# Patient Record
Sex: MALE | Race: BLACK OR AFRICAN AMERICAN | URBAN - METROPOLITAN AREA
[De-identification: names, ages, dates, MRNs, and addresses within clinical notes are randomized per-mention and may not be internally consistent; named-entity substitution may affect disease eponyms.]

---

## 2017-02-20 ENCOUNTER — THERAPY VISIT (OUTPATIENT)
Dept: PHYSICAL THERAPY | Facility: CLINIC | Age: 62
End: 2017-02-20
Payer: MEDICARE

## 2017-02-20 DIAGNOSIS — M54.2 CERVICALGIA: Primary | ICD-10-CM

## 2017-02-20 PROCEDURE — 97140 MANUAL THERAPY 1/> REGIONS: CPT | Mod: GP | Performed by: PHYSICAL THERAPIST

## 2017-02-20 PROCEDURE — 97161 PT EVAL LOW COMPLEX 20 MIN: CPT | Mod: GP | Performed by: PHYSICAL THERAPIST

## 2017-02-20 PROCEDURE — 97110 THERAPEUTIC EXERCISES: CPT | Mod: GP | Performed by: PHYSICAL THERAPIST

## 2017-02-20 PROCEDURE — G8981 BODY POS CURRENT STATUS: HCPCS | Mod: GP | Performed by: PHYSICAL THERAPIST

## 2017-02-20 PROCEDURE — G8982 BODY POS GOAL STATUS: HCPCS | Mod: GP | Performed by: PHYSICAL THERAPIST

## 2017-02-20 NOTE — LETTER
DEPARTMENT OF HEALTH AND HUMAN SERVICES  CENTERS FOR MEDICARE & MEDICAID SERVICES    PLAN/UPDATED PLAN OF PROGRESS FOR OUTPATIENT REHABILITATION    PATIENTS NAME:  Adolfo Koo   : 1955  PR[facility] NUMBER:    [id number]  [id number]   PROVIDER NAME: [facility] - [facility] [place] PHYSICAL THERAPY  MEDICAL RECORD NUMBER: [id number]   START OF CARE DATE:  SOC Date: 17   TYPE:  PT  PRIMARY/TREATMENT DIAGNOSIS: (Pertinent Medical Diagnosis)  Cervicalgia    VISITS FROM START OF CARE:  Rxs Used: 1     Subjective:    Adolfo Koo is a 61 year old male with a cervical spine condition.  Condition occurred with:  Insidious onset.  Condition occurred: for unknown reasons.  This is a chronic and recurrent condition  Patient presents to PT today with c/o ongoing chronic neck pain. Patient stated the pain has been ongoing for many years; has had multiple injections and other treatments ( PT, Chiropractic, Pain clinic, and Pool Therapy).  Patient receives assistance from his PCA with treatment of his neck (application of heating pad and creams)  Patient referred to PT on 17.  Patient reports pain:  Central cervical spine, cervical left side and cervical right side (L side > R side).  Radiates to:  None (has in the past).  Pain is described as aching and other (Tense pain) and is constant and reported as 4/10 and 9/10 (pain range).  Associated symptoms:  Headache and loss of motion/stiffness. Pain is worse during the day.  Symptoms are exacerbated by sitting, rotating head and other (standing/walking) and relieved by heat and analgesics.  Since onset symptoms are unchanged.  Special tests:  X-ray (Degenerative Changes).  Previous treatment includes physical therapy and chiropractic.  There was mild and moderate improvement following previous treatment.  General health as reported by patient is poor.  Pertinent medical history includes:  Smoking.  Medical allergies: no.  Other surgeries  include:  No.  Current medications:  Pain medication.  Current occupation is Patient does not work.      Barriers include:  Lives alone (has PCA to help wtih general ADL's each day (3 hrs/day)).  Red flags:  None as reported by the patient.                 Objective:  Standing Alignment:    Cervical/Thoracic:  Forward head  Shoulder/UE:  Rounded shoulders  Lumbar:  Normal  Cervical/Thoracic Evaluation  AROM:  AROM Cervical:  Flexion:            Chin to chest  Extension:       Mod loss; pain  Rotation:         Left: mod loss; pain L side     Right: min loss; no pain  Side Bend:      Left: min loss; pain L side     Right:  Min loss; pulling L side  Headaches: cervical  Cervical Myotomes:  Cervical myotomes:  strength: R= 55#; L= 45#  Cervical Dermatomes:  normal  Cervical Palpation:    Tenderness present at Left:    Upper Trap; Levator; Erector Spinae and Suboccipitals  Tenderness present at Right:    Upper Trap; Levator; Erector Spinae and Suboccipitals                    Assessment/Plan:      Patient is a 61 year old male with cervical complaints.    Patient has the following significant findings with corresponding treatment plan.                Diagnosis 1:  Neck Pain  Pain -  hot/cold therapy, US and manual therapy  Decreased ROM/flexibility - manual therapy, therapeutic exercise, therapeutic activity and home program  Decreased joint mobility - manual therapy and therapeutic exercise  Impaired muscle performance - neuro re-education  Decreased function - therapeutic activities  Impaired posture - neuro re-education  Therapy Evaluation Codes:   1) History comprised of:   Personal factors that impact the plan of care:      Past/current experiences and Time since onset of symptoms.    Comorbidity factors that impact the plan of care are:      Smoking.     Medications impacting care: Pain.  2) Examination of Body Systems comprised of:   Body structures and functions that impact the plan of care:      Cervical  "spine.   Activity limitations that impact the plan of care are:      Driving, Standing and Sleeping.  3) Clinical presentation characteristics are:   Stable/Uncomplicated.  4) Decision-Making    Low complexity using standardized patient assessment instrument and/or measureable assessment of functional outcome.  Cumulative Therapy Evaluation is: Low complexity.    Previous and current functional limitations:  (See Goal Flow Sheet for this information)    Short term and Long term goals: (See Goal Flow Sheet for this information)   Communication ability:  Patient appears to be able to clearly communicate and understand verbal and written communication and follow directions correctly.  Treatment Explanation - The following has been discussed with the patient:   RX ordered/plan of care  Anticipated outcomes  Possible risks and side effects  This patient would benefit from PT intervention to resume normal activities.   Rehab potential is good.  Frequency:  1 X week, once daily  Duration:  for 8 weeks  Discharge Plan:  Achieve all LTG.  Independent in home treatment program.  Reach maximal therapeutic benefit.        Caregiver Signature/Credentials _____________________________ Date ________       Treating Provider: Arlene Luther, PT   I have reviewed and certified the need for these services and plan of treatment while under my care.        PHYSICIAN'S SIGNATURE:   _________________________________________  Date___________     Jacques ROMA Rodney    Certification period:  Beginning of Cert date period: 02/20/17 to  End of Cert period date: 05/20/17     Functional Level Progress Report: Please see attached \"Goal Flow sheet for Functional level.\"    ____X____ Continue Services or       ________ DC Services                Service dates: From  SOC Date: 02/20/17 date to present                         "

## 2017-02-20 NOTE — PROGRESS NOTES
Subjective:    Adolfo Koo is a 61 year old male with a cervical spine condition.  Condition occurred with:  Insidious onset.  Condition occurred: for unknown reasons.  This is a chronic and recurrent condition  Patient presents to PT today with c/o ongoing chronic neck pain. Patient stated the pain has been ongoing for many years; has had multiple injections and other treatments ( PT, Chiropractic, Pain clinic, and Pool Therapy).  Patient receives assistance from his PCA with treatment of his neck (application of heating pad and creams)  Patient referred to PT on 2/13/17.  .    Patient reports pain:  Central cervical spine, cervical left side and cervical right side (L side > R side).  Radiates to:  None (has in the past).  Pain is described as aching and other (Tense pain) and is constant and reported as 4/10 and 9/10 (pain range).  Associated symptoms:  Headache and loss of motion/stiffness. Pain is worse during the day.  Symptoms are exacerbated by sitting, rotating head and other (standing/walking) and relieved by heat and analgesics.  Since onset symptoms are unchanged.  Special tests:  X-ray (Degenerative Changes).  Previous treatment includes physical therapy and chiropractic.  There was mild and moderate improvement following previous treatment.  General health as reported by patient is poor.  Pertinent medical history includes:  Smoking.  Medical allergies: no.  Other surgeries include:  No.  Current medications:  Pain medication.  Current occupation is Patient does not work.        Barriers include:  Lives alone (has PCA to help wtih general ADL's each day (3 hrs/day)).    Red flags:  None as reported by the patient.                      Objective:    Standing Alignment:    Cervical/Thoracic:  Forward head  Shoulder/UE:  Rounded shoulders  Lumbar:  Normal                                Cervical/Thoracic Evaluation    AROM:  AROM Cervical:    Flexion:            Chin to chest  Extension:       Mod  loss; pain  Rotation:         Left: mod loss; pain L side     Right: min loss; no pain  Side Bend:      Left: min loss; pain L side     Right:  Min loss; pulling L side      Headaches: cervical  Cervical Myotomes:  Cervical myotomes:  strength: R= 55#; L= 45#                      Cervical Dermatomes:  normal                    Cervical Palpation:    Tenderness present at Left:    Upper Trap; Levator; Erector Spinae and Suboccipitals  Tenderness present at Right:    Upper Trap; Levator; Erector Spinae and Suboccipitals                                                  General     ROS    Assessment/Plan:      Patient is a 61 year old male with cervical complaints.    Patient has the following significant findings with corresponding treatment plan.                Diagnosis 1:  Neck Pain  Pain -  hot/cold therapy, US and manual therapy  Decreased ROM/flexibility - manual therapy, therapeutic exercise, therapeutic activity and home program  Decreased joint mobility - manual therapy and therapeutic exercise  Impaired muscle performance - neuro re-education  Decreased function - therapeutic activities  Impaired posture - neuro re-education    Therapy Evaluation Codes:   1) History comprised of:   Personal factors that impact the plan of care:      Past/current experiences and Time since onset of symptoms.    Comorbidity factors that impact the plan of care are:      Smoking.     Medications impacting care: Pain.  2) Examination of Body Systems comprised of:   Body structures and functions that impact the plan of care:      Cervical spine.   Activity limitations that impact the plan of care are:      Driving, Standing and Sleeping.  3) Clinical presentation characteristics are:   Stable/Uncomplicated.  4) Decision-Making    Low complexity using standardized patient assessment instrument and/or measureable assessment of functional outcome.  Cumulative Therapy Evaluation is: Low complexity.    Previous and current  functional limitations:  (See Goal Flow Sheet for this information)    Short term and Long term goals: (See Goal Flow Sheet for this information)     Communication ability:  Patient appears to be able to clearly communicate and understand verbal and written communication and follow directions correctly.  Treatment Explanation - The following has been discussed with the patient:   RX ordered/plan of care  Anticipated outcomes  Possible risks and side effects  This patient would benefit from PT intervention to resume normal activities.   Rehab potential is good.    Frequency:  1 X week, once daily  Duration:  for 8 weeks  Discharge Plan:  Achieve all LTG.  Independent in home treatment program.  Reach maximal therapeutic benefit.    Please refer to the daily flowsheet for treatment today, total treatment time and time spent performing 1:1 timed codes.

## 2017-02-20 NOTE — MR AVS SNAPSHOT
"              After Visit Summary   2/20/2017    Adolfo Koo    MRN: [id number]           Patient Information     Date Of Birth          1955        Visit Information        Provider Department      2/20/2017 9:30 AM Arlene Luther, [place] [facility]        Today's Diagnoses     Cervicalgia    -  1       Follow-ups after your visit        Your next 10 appointments already scheduled     Mar 02, 2017  9:30 AM CST   VEGA Spine with Arlene Luther PT   [facility]  )    [address]  [place] [address]   [phone]            Mar 06, 2017  9:30 AM CST   VEGA Spine with Arlene Luther PT   [facility] ([facility]  )    [address]  [place] [address]   [phone]              Who to contact     If you have questions or need follow up information about today's clinic visit or your schedule please contact [facility] directly at [phone].  Normal or non-critical lab and imaging results will be communicated to you by MyChart, letter or phone within 4 business days after the clinic has received the results. If you do not hear from us within 7 days, please contact the clinic through MyChart or phone. If you have a critical or abnormal lab result, we will notify you by phone as soon as possible.  Submit refill requests through [employer] or call your pharmacy and they will forward the refill request to us. Please allow 3 business days for your refill to be completed.          Additional Information About Your Visit        [employer] Information     [employer] lets you send messages to your doctor, view your test results, renew your prescriptions, schedule appointments and more. To sign up, go to [email or web address] . Click on \"Log " "in\" on the left side of the screen, which will take you to the Welcome page. Then click on \"Sign up Now\" on the right side of the page.     You will be asked to enter the access code listed below, as well as some personal information. Please follow the directions to create your username and password.     Your access code is: [email or web address]  Expires: 2017 10:30 AM     Your access code will  in 90 days. If you need help or a new code, please call your [facility] clinic or [phone].        Care EveryWhere ID     This is your Care EveryWhere ID. This could be used by other organizations to access your [facility] medical records  [id number]         Blood Pressure from Last 3 Encounters:   01/21/15 122/71   08 94/64   08 98/60    Weight from Last 3 Encounters:   01/21/15 73.9 kg (163 lb)   07 79.4 kg (175 lb)   06 69.4 kg (153 lb)              We Performed the Following     HC PT EVAL, LOW COMPLEXITY     VEGA CERT REPORT     MANUAL THER TECH,1+REGIONS,EA 15 MIN     THERAPEUTIC EXERCISES        Primary Care Provider Office Phone # Fax #    Js Farris [phone]       [facility] [address] [facility] [address]        Thank you!     Thank you for [facility] PH[facility] THERAP[facility]  for your care. Our goal is always to provide you with excellent care. Hearing back from our patients is one way we can continue to improve our services. Please take a few minutes to complete the written survey that you may receive in the mail after your visit with us. Thank you!             Your Updated Medication List - Protect others around you: Learn how to safely use, store and throw away your medicines at www.disposemymeds.org.          This list is accurate as of: 17 10:30 AM.  Always use your most recent med list.                   Brand Name Dispense Instructions for use    ADVAIR DISKUS 250-50 MCG/DOSE diskus inhaler "   Generic drug:  fluticasone-salmeterol     1    1 inhalation BID       albuterol 108 (90 BASE) MCG/ACT Inhaler   Generic drug:  albuterol     1    2 puffs up to four times per day as needed       albuterol 90 MCG/ACT inhaler     1    1-2 puffs Q 4-6 hrs prn       ibuprofen 600 MG tablet    ADVIL/MOTRIN    90    1 tablet up to three times per day as needed (take with food)       LIDODERM 5 % Patch   Generic drug:  lidocaine     30    Apply 1-3 daily as neede to painful areas       NEURONTIN 100 MG capsule   Generic drug:  gabapentin     90    Take 1 at night.  Increase by 1 every 7 days until taking 1 every 8 hrs.       SINGULAIR 10 MG tablet   Generic drug:  montelukast     1 month    1 tab po QD (Once per day)       ULTRAM 50 MG tablet   Generic drug:  traMADol     90    1 TABLET EVERY 8-12 HOURS AS NEEDED

## 2017-03-02 ENCOUNTER — THERAPY VISIT (OUTPATIENT)
Dept: PHYSICAL THERAPY | Facility: CLINIC | Age: 62
End: 2017-03-02
Payer: MEDICARE

## 2017-03-02 DIAGNOSIS — M54.2 CERVICALGIA: ICD-10-CM

## 2017-03-02 PROCEDURE — 97110 THERAPEUTIC EXERCISES: CPT | Mod: GP | Performed by: PHYSICAL THERAPIST

## 2017-03-02 PROCEDURE — 97112 NEUROMUSCULAR REEDUCATION: CPT | Mod: GP | Performed by: PHYSICAL THERAPIST

## 2017-03-02 PROCEDURE — 97140 MANUAL THERAPY 1/> REGIONS: CPT | Mod: GP | Performed by: PHYSICAL THERAPIST

## 2017-03-14 ENCOUNTER — THERAPY VISIT (OUTPATIENT)
Dept: PHYSICAL THERAPY | Facility: CLINIC | Age: 62
End: 2017-03-14
Payer: MEDICARE

## 2017-03-14 DIAGNOSIS — M54.2 CERVICALGIA: ICD-10-CM

## 2017-03-14 PROCEDURE — 97110 THERAPEUTIC EXERCISES: CPT | Mod: GP | Performed by: PHYSICAL THERAPIST

## 2017-03-14 PROCEDURE — G0283 ELEC STIM OTHER THAN WOUND: HCPCS | Mod: GP | Performed by: PHYSICAL THERAPIST

## 2017-03-14 NOTE — LETTER
[facility] PHYSICAL THERAPY  [address]  [place] [address]  [phone]    2017    Re: Adolfo Koo   :   1955  MRN:  [id number]   REFERRING PHYSICIAN:   Jacques Rodney    [facility] PHYSICAL [facility]    Date of Initial Evaluation:  2017  Visits:  Rxs Used: 3  Reason for Referral:  Cervicalgia    EVALUATION SUMMARY    PROGRESS  REPORT  Progress reporting period is from 17 to 3/14/17.       SUBJECTIVE  Subjective changes noted by patient:  Pt not feeling well today; has lots of pain in his back and legs today. pt has not taken any pain medication because he had to drive and doesn't like to take the medication when he has to drive. Patient would like to follow up with MD before doing any more PT.    Current pain level is: 10/10.     Previous pain level was: 5/10.   Changes in function:  None  Adverse reaction to treatment or activity: treatment and activity - increase pain  OBJECTIVE  Changes noted in objective findings:    Objective information from 3/14/17 date of service.  Poor tolerance for activity today; constant c/o severe pain.    ASSESSMENT/PLAN  Updated problem list and treatment plan: Diagnosis 1:  Neck pain  Pain -  hot/cold therapy, US, electric stimulation and manual therapy  Impaired muscle performance - home program  Decreased function - therapeutic activities and home program  STG/LTGs have been met or progress has been made towards goals:  None  Assessment of Progress: The patient has had set backs in their progress.  The patient's condition has exacerbated.  Self Management Plans:  Patient has been instructed in a home treatment program.        Recommendations:  Pt would like to return to MD for follow up due to elevated pain.          Thank you for your referral.    INQUIRIES  Therapist: Arlene Luther, [facility] ATH[facility] PHYSICAL  THERAPY  [address]  [place] [address]  Phone: [phone]  Fax: [phone]

## 2017-03-14 NOTE — MR AVS SNAPSHOT
"              After Visit Summary   3/14/2017    Adolfo Koo    MRN: [id number]           Patient Information     Date Of Birth          1955        Visit Information        Provider Department      3/14/2017 9:20 AM [facility]        Today's Diagnoses     Cervicalgia           Follow-ups after your visit        Who to contact     If you have questions or need follow up information about today's clinic visit or your schedule please contact [facility] directly at [phone].  Normal or non-critical lab and imaging results will be communicated to you by [employer]hart, letter or phone within 4 business days after the clinic has received the results. If you do not hear from us within 7 days, please contact the clinic through [employer] or phone. If you have a critical or abnormal lab result, we will notify you by phone as soon as possible.  Submit refill requests through [employer] or call your pharmacy and they will forward the refill request to us. Please allow 3 business days for your refill to be completed.          Additional Information About Your Visit        MyChart Information     [employer] lets you send messages to your doctor, view your test results, renew your prescriptions, schedule appointments and more. To sign up, go to [email or web address] . Click on \"Log in\" on the left side of the screen, which will take you to the Welcome page. Then click on \"Sign up Now\" on the right side of the page.     You will be asked to enter the access code listed below, as well as some personal information. Please follow the directions to create your username and password.     Your access code is: [email or web address]  Expires: 2017 11:30 AM     Your access code will  in 90 days. If you need help or a new code, please call your [facility] clinic or [phone].        Care EveryWhere ID     This " is your Care EveryWhere ID. This could be used by other organizations to access your [facility] medical records  [id number]         Blood Pressure from Last 3 Encounters:   01/21/15 122/71   08/07/08 94/64   06/26/08 98/60    Weight from Last 3 Encounters:   01/21/15 73.9 kg (163 lb)   05/30/07 79.4 kg (175 lb)   11/02/06 69.4 kg (153 lb)              We Performed the Following     ELECTRIC STIMULATION THERAPY     THERAPEUTIC EXERCISES        Primary Care Provider Office Phone # Fax #    Js Farris [phone]       [facility] [place] [address] [facility] [address]        Thank you!     Thank you for choosing [facility] PHYSICAL THERAPY  for your care. Our goal is always to provide you with excellent care. Hearing back from our patients is one way we can continue to improve our services. Please take a few minutes to complete the written survey that you may receive in the mail after your visit with us. Thank you!             Your Updated Medication List - Protect others around you: Learn how to safely use, store and throw away your medicines at www.disposemymeds.org.          This list is accurate as of: 3/14/17  1:37 PM.  Always use your most recent med list.                   Brand Name Dispense Instructions for use    ADVAIR DISKUS 250-50 MCG/DOSE diskus inhaler   Generic drug:  fluticasone-salmeterol     1    1 inhalation BID       albuterol 108 (90 BASE) MCG/ACT Inhaler   Generic drug:  albuterol     1    2 puffs up to four times per day as needed       albuterol 90 MCG/ACT inhaler     1    1-2 puffs Q 4-6 hrs prn       ibuprofen 600 MG tablet    ADVIL/MOTRIN    90    1 tablet up to three times per day as needed (take with food)       LIDODERM 5 % Patch   Generic drug:  lidocaine     30    Apply 1-3 daily as neede to painful areas       NEURONTIN 100 MG capsule   Generic drug:  gabapentin     90    Take 1 at night.  Increase by 1 every 7  days until taking 1 every 8 hrs.       SINGULAIR 10 MG tablet   Generic drug:  montelukast     1 month    1 tab po QD (Once per day)       ULTRAM 50 MG tablet   Generic drug:  traMADol     90    1 TABLET EVERY 8-12 HOURS AS NEEDED

## 2017-03-28 PROBLEM — M54.2 CERVICALGIA: Status: RESOLVED | Noted: 2017-02-20 | Resolved: 2017-03-28

## 2017-03-28 NOTE — PROGRESS NOTES
Subjective:    HPI                    Objective:    System    Physical Exam    General     ROS    Assessment/Plan:      PROGRESS  REPORT    Progress reporting period is from 2/20/17 to 3/14/17.       SUBJECTIVE  Subjective changes noted by patient:  Pt not feeling well today; has lots of pain in his back and legs today. pt has not taken any pain medication because he had to drive and doesn't like to take the medication when he has to drive. Patient would like to follow up with MD before doing any more PT.    Current pain level is: 10/10.     Previous pain level was: 5/10.   Changes in function:  None  Adverse reaction to treatment or activity: treatment and activity - increase pain    OBJECTIVE  Changes noted in objective findings:    Objective information from 3/14/17 date of service.  Poor tolerance for activity today; constant c/o severe pain.      ASSESSMENT/PLAN  Updated problem list and treatment plan: Diagnosis 1:  Neck pain  Pain -  hot/cold therapy, US, electric stimulation and manual therapy  Impaired muscle performance - home program  Decreased function - therapeutic activities and home program  STG/LTGs have been met or progress has been made towards goals:  None  Assessment of Progress: The patient has had set backs in their progress.  The patient's condition has exacerbated.  Self Management Plans:  Patient has been instructed in a home treatment program.        Recommendations:  Pt would like to return to MD for follow up due to elevated pain.    Please refer to the daily flowsheet for treatment today, total treatment time and time spent performing 1:1 timed codes.

## 2017-05-10 ENCOUNTER — THERAPY VISIT (OUTPATIENT)
Dept: PHYSICAL THERAPY | Facility: CLINIC | Age: 62
End: 2017-05-10
Payer: MEDICARE

## 2017-05-10 DIAGNOSIS — M54.2 CERVICALGIA: Primary | ICD-10-CM

## 2017-05-10 PROCEDURE — G8981 BODY POS CURRENT STATUS: HCPCS | Mod: GP | Performed by: PHYSICAL THERAPIST

## 2017-05-10 PROCEDURE — G8982 BODY POS GOAL STATUS: HCPCS | Mod: GP | Performed by: PHYSICAL THERAPIST

## 2017-05-10 PROCEDURE — 97112 NEUROMUSCULAR REEDUCATION: CPT | Mod: GP | Performed by: PHYSICAL THERAPIST

## 2017-05-10 PROCEDURE — 97161 PT EVAL LOW COMPLEX 20 MIN: CPT | Mod: GP | Performed by: PHYSICAL THERAPIST

## 2017-05-10 PROCEDURE — 97110 THERAPEUTIC EXERCISES: CPT | Mod: GP | Performed by: PHYSICAL THERAPIST

## 2017-05-10 NOTE — LETTER
DEPARTMENT OF HEALTH AND HUMAN SERVICES  CENTERS FOR MEDICARE & MEDICAID SERVICES    PLAN/UPDATED PLAN OF PROGRESS FOR OUTPATIENT REHABILITATION    PATIENTS NAME:  Adolfo Koo   : 1955  PROVIDER NUMBER:    [id number]    [facility]  [id number]     PROVIDER NAME: [place] [facility]AP[facility]    MEDICAL RECORD NUMBER: [id number]     START OF CARE DATE:  SOC Date: 05/10/17   TYPE:  PT    PRIMARY/TREATMENT DIAGNOSIS: (Pertinent Medical Diagnosis)  Cervicalgia    VISITS FROM START OF CARE:  Rxs Used: 1     [place] [facility] Initial Evaluation    Subjective:  HPI Comments: At the time this evaluation, MD orders not present, pt requesting treatment for his neck as it is the worst. Patient reporting that he has had much PT in the past, including at pain clinic.  Patient is a 61 year old male presenting with rehab cervical spine hpi.   Adolfo Koo is a 61 year old male with a cervical spine condition.      This is a chronic condition  Patient MD one week ago for neck and LBP that has been ongoing since  when he originally fell down a flight of steps at work. WC case now settled.    Patient reports pain:  Cervical left side.  Radiates to:  Head.  Pain is described as aching and is intermittent and reported as 5/10 and 8/10 (at its worst).  Associated symptoms:  Loss of motion/stiffness and headache. Pain is worse in the P.M..  Symptoms are exacerbated by rotating head (laying on left side, sitting) and relieved by ice and heat (pain meds).  Since onset symptoms are gradually worsening.  Special tests:  X-ray (pt unclear as to the results).  Previous treatment includes physical therapy.  There was mild improvement following previous treatment.  General health as reported by patient is good.  Pertinent medical history includes:  Asthma.  Medical allergies: no.  Other surgeries include:  Orthopedic surgery (bilateral knees).  Current medications:  Pain medication  (Ibuprofen).  Current occupation is none.      Barriers: PCA 7 days/week, 4 hours/day.  Red flags:  None as reported by the patient.                  Objective:  Flexibility/Screens:   Upper Extremity:    Decreased left upper extremity flexibility at:  Pectoralis Major and Pectoralis Minor  Decreased right upper extremity flexibility present at:  Pectoralis Major and Pectoralis Minor  PATIENTS NAME:  Adolfo Koo   : 1955    Spine:  Decreased left spine flexibility:  Sternocleidomastoid; Scalenes and Upper Trap  Decreased right spine flexibility:  Sternocleidomastoid; Scalenes and Upper Trap         Cervical/Thoracic Evaluation  Cervical Myotomes:  normal  Cervical Dermatomes:  normal  Cervical Palpation:    Tenderness present at Left:    Sternocleidomstoid; Scalenes; Upper Trap and Erector Spinae  Spinal Segmental Conclusions:    Level:  Hypo at T2, T1, C7, C6, C5 and C4    Josias Cervical Evaluation  Posture:  Sitting: poor  Standing: poor  Protruding Head: yes  Wry Neck: no  Correction of Posture: no effect  Movement Loss:  Protrusion (PRO): nil  Flexion (Flex): nil  Retraction (RET): min and pain  Extension (EXT): major and pain  Lateral Flexion Right (LF R): major and pain  Lateral Flexion Left (LF L): major and pain  Rotation Right (ROT R): min and pain  Rotation Left (ROT L): mod and pain  Test Movements:  RET: During: increases  After: no worse  Mechanical Response: no effect  Repeat RET: During: decreases  After: better  Mechanical Response: IncROM  RET EXT: During: increases  After: no worse  Mechanical Response: no effect  Repeat RET EXT: During: increases  After: worse  Mechanical Response: no effect  Pretest Pain Sitting: L cervical  LF L: During: increases  After: no worse  Mechanical Response: no effect  Repeat LF L: During: decreases  After: better  Mechanical Response: IncROM  Conclusion: derangement (vs other, provisional)  Principle of Treatment:  Posture Correction: slouch/over  correct, location of neutral spine, use of lumbar support, use of cervical roll while sleeping  Extension: seated retraction with pt OP  Lateral: seated left lateral flexion with pt OP    Assessment/Plan:    Patient is a 61 year old male with cervical complaints.    Patient has the following significant findings with corresponding treatment plan.                  PATIENTS NAME:  Adolfo Koo   : 1955    Diagnosis 1:  Neck pain  Pain -  manual therapy, self management, education, directional preference exercise and home program  Decreased ROM/flexibility - manual therapy, therapeutic exercise and home program  Decreased joint mobility - manual therapy, therapeutic exercise and home program  Decreased function - therapeutic activities and home program  Impaired posture - neuro re-education, therapeutic activities and home program    Therapy Evaluation Codes:   1) History comprised of:   Personal factors that impact the plan of care:      Time since onset of symptoms.    Comorbidity factors that impact the plan of care are:      Asthma.     Medications impacting care: Pain.  2) Examination of Body Systems comprised of:   Body structures and functions that impact the plan of care:      Cervical spine.   Activity limitations that impact the plan of care are:      Lifting, Sitting and Sleeping.  3) Clinical presentation characteristics are:   Stable/Uncomplicated.  4) Decision-Making    Low complexity using standardized patient assessment instrument and/or measureable assessment of functional outcome.  Cumulative Therapy Evaluation is: Low complexity.    Previous and current functional limitations:  (See Goal Flow Sheet for this information)    Short term and Long term goals: (See Goal Flow Sheet for this information)     Communication ability:  Patient appears to be able to clearly communicate and understand verbal and written communication and follow directions correctly.  Treatment Explanation - The following  "has been discussed with the patient:   RX ordered/plan of care  Anticipated outcomes  Possible risks and side effects  This patient would benefit from PT intervention to resume normal activities.   Rehab potential is fair to good.    Frequency:  1 X week, once daily  Duration:  for 8 weeks  Discharge Plan:  Achieve all LTG.  Independent in home treatment program.  Reach maximal therapeutic benefit.    Caregiver Signature/Credentials _____________________________ Date ________       Treating Provider: Susie Sherwood, PT     PATIENTS NAME:  Adolfo Koo   : 1955    I have reviewed and certified the need for these services and plan of treatment while under my care.        PHYSICIAN'S SIGNATURE:   _________________________________________  Date___________   Julia Lee MD    Certification period:  Beginning of Cert date period: 05/10/17 to  End of Cert period date: 17     Functional Level Progress Report: Please see attached \"Goal Flow sheet for Functional level.\"    ____X____ Continue Services or       ________ DC Services                Service dates: From  SOC Date: 05/10/17 date to present                         "

## 2017-05-10 NOTE — PROGRESS NOTES
[facility] Initial Evaluation      Subjective:    HPI Comments: At the time this evaluation, MD orders not present, pt requesting treatment for his neck as it is the worst. Patient reporting that he has had much PT in the past, including at pain clinic.    Patient is a 61 year old male presenting with rehab cervical spine hpi.   Adolfo Koo is a 61 year old male with a cervical spine condition.      This is a chronic condition  Patient MD on 5/3/17 for neck and LBP that has been ongoing since 2002 when he originally fell down a flight of steps at work. WC case now settled. MD orders dated 5/10/17.    Patient reports pain:  Cervical left side.  Radiates to:  Head.  Pain is described as aching and is intermittent and reported as 5/10 and 8/10 (at its worst).  Associated symptoms:  Loss of motion/stiffness and headache. Pain is worse in the P.M..  Symptoms are exacerbated by rotating head (laying on left side, sitting) and relieved by ice and heat (pain meds).  Since onset symptoms are gradually worsening.  Special tests:  X-ray (pt unclear as to the results).  Previous treatment includes physical therapy.  There was mild improvement following previous treatment.  General health as reported by patient is good.  Pertinent medical history includes:  Asthma.  Medical allergies: no.  Other surgeries include:  Orthopedic surgery (bilateral knees).  Current medications:  Pain medication (Ibuprofen).  Current occupation is none.        Barriers: PCA 7 days/week, 4 hours/day.    Red flags:  None as reported by the patient.                        Objective:          Flexibility/Screens:     Upper Extremity:    Decreased left upper extremity flexibility at:  Pectoralis Major and Pectoralis Minor    Decreased right upper extremity flexibility present at:  Pectoralis Major and Pectoralis Minor    Spine:  Decreased left spine flexibility:  Sternocleidomastoid; Scalenes and Upper Trap    Decreased right  spine flexibility:  Sternocleidomastoid; Scalenes and Upper Trap                  Cervical/Thoracic Evaluation      Cervical Myotomes:  normal                      Cervical Dermatomes:  normal                    Cervical Palpation:    Tenderness present at Left:    Sternocleidomstoid; Scalenes; Upper Trap and Erector Spinae        Spinal Segmental Conclusions:    Level:  Hypo at T2, T1, C7, C6, C5 and C4                                                Josias Cervical Evaluation    Posture:  Sitting: poor  Standing: poor  Protruding Head: yes  Wry Neck: no  Correction of Posture: no effect    Movement Loss:  Protrusion (PRO): nil  Flexion (Flex): nil  Retraction (RET): min and pain  Extension (EXT): major and pain  Lateral Flexion Right (LF R): major and pain  Lateral Flexion Left (LF L): major and pain  Rotation Right (ROT R): min and pain  Rotation Left (ROT L): mod and pain  Test Movements:      RET: During: increases  After: no worse  Mechanical Response: no effect  Repeat RET: During: decreases  After: better  Mechanical Response: IncROM  RET EXT: During: increases  After: no worse  Mechanical Response: no effect  Repeat RET EXT: During: increases  After: worse  Mechanical Response: no effect        Pretest Pain Sitting: L cervical    LF L: During: increases  After: no worse  Mechanical Response: no effect  Repeat LF L: During: decreases  After: better  Mechanical Response: IncROM            Conclusion: derangement (vs other, provisional)  Principle of Treatment:  Posture Correction: slouch/over correct, location of neutral spine, use of lumbar support, use of cervical roll while sleeping    Extension: seated retraction with pt OP  Lateral: seated left lateral flexion with pt OP                                           ROS    Assessment/Plan:      Patient is a 61 year old male with cervical complaints.    Patient has the following significant findings with corresponding treatment plan.                Diagnosis  1:  Neck pain  Pain -  manual therapy, self management, education, directional preference exercise and home program  Decreased ROM/flexibility - manual therapy, therapeutic exercise and home program  Decreased joint mobility - manual therapy, therapeutic exercise and home program  Decreased function - therapeutic activities and home program  Impaired posture - neuro re-education, therapeutic activities and home program    Therapy Evaluation Codes:   1) History comprised of:   Personal factors that impact the plan of care:      Time since onset of symptoms.    Comorbidity factors that impact the plan of care are:      Asthma.     Medications impacting care: Pain.  2) Examination of Body Systems comprised of:   Body structures and functions that impact the plan of care:      Cervical spine.   Activity limitations that impact the plan of care are:      Lifting, Sitting and Sleeping.  3) Clinical presentation characteristics are:   Stable/Uncomplicated.  4) Decision-Making    Low complexity using standardized patient assessment instrument and/or measureable assessment of functional outcome.  Cumulative Therapy Evaluation is: Low complexity.    Previous and current functional limitations:  (See Goal Flow Sheet for this information)    Short term and Long term goals: (See Goal Flow Sheet for this information)     Communication ability:  Patient appears to be able to clearly communicate and understand verbal and written communication and follow directions correctly.  Treatment Explanation - The following has been discussed with the patient:   RX ordered/plan of care  Anticipated outcomes  Possible risks and side effects  This patient would benefit from PT intervention to resume normal activities.   Rehab potential is fair to good.    Frequency:  1 X week, once daily  Duration:  for 8 weeks  Discharge Plan:  Achieve all LTG.  Independent in home treatment program.  Reach maximal therapeutic benefit.    Please refer to the  daily flowsheet for treatment today, total treatment time and time spent performing 1:1 timed codes.

## 2017-05-10 NOTE — LETTER
DEPARTMENT OF HEALTH AND HUMAN SERVICES  CENTERS FOR MEDICARE & MEDICAID SERVICES    PLAN/UPDATED PLAN OF PROGRESS FOR OUTPATIENT REHABILITATION    PATIENTS NAME:  Adolfo Koo   : 1955  PROVIDER NUMBER:    [id number]    [id number]     PROVIDER NAME: [facility] [facility]    MEDICAL RECORD NUMBER: [id number]     START OF CARE DATE:  SOC Date: 05/10/17   TYPE:  PT    PRIMARY/TREATMENT DIAGNOSIS: (Pertinent Medical Diagnosis)  Cervicalgia    VISITS FROM START OF CARE:  Rxs Used: 1     [place] [facility] Initial Evaluation    Subjective:  HPI Comments: At the time this evaluation, MD orders not present, pt requesting treatment for his neck as it is the worst. Patient reporting that he has had much PT in the past, including at pain clinic.  Patient is a 61 year old male presenting with rehab cervical spine hpi.   Adolfo Koo is a 61 year old male with a cervical spine condition.  This is a chronic condition  Patient MD on 5/3/17 for neck and LBP that has been ongoing since  when he originally fell down a flight of steps at work. WC case now settled. MD orders dated 5/10/17.    Patient reports pain:  Cervical left side.  Radiates to:  Head.  Pain is described as aching and is intermittent and reported as 5/10 and 8/10 (at its worst).  Associated symptoms:  Loss of motion/stiffness and headache. Pain is worse in the P.M..  Symptoms are exacerbated by rotating head (laying on left side, sitting) and relieved by ice and heat (pain meds).  Since onset symptoms are gradually worsening.  Special tests:  X-ray (pt unclear as to the results).  Previous treatment includes physical therapy.  There was mild improvement following previous treatment.  General health as reported by patient is good.  Pertinent medical history includes:  Asthma.  Medical allergies: no.  Other surgeries include:  Orthopedic surgery (bilateral knees).  Current medications:   Pain medication (Ibuprofen).  Current occupation is none.      Barriers: PCA 7 days/week, 4 hours/day.  Red flags:  None as reported by the patient.         Objective:  Flexibility/Screens:   Upper Extremity:    Decreased left upper extremity flexibility at:  Pectoralis Major and Pectoralis Minor    PATIENTS NAME:  Adolfo Koo   : 1955    Decreased right upper extremity flexibility present at:  Pectoralis Major and Pectoralis Minor  Spine:  Decreased left spine flexibility:  Sternocleidomastoid; Scalenes and Upper Trap  Decreased right spine flexibility:  Sternocleidomastoid; Scalenes and Upper Trap          Cervical/Thoracic Evaluation  Cervical Myotomes:  normal  Cervical Dermatomes:  normal  Cervical Palpation:    Tenderness present at Left:    Sternocleidomstoid; Scalenes; Upper Trap and Erector Spinae  Spinal Segmental Conclusions:    Level:  Hypo at T2, T1, C7, C6, C5 and C4    Josias Cervical Evaluation  Posture:  Sitting: poor  Standing: poor  Protruding Head: yes  Wry Neck: no  Correction of Posture: no effect  Movement Loss:  Protrusion (PRO): nil  Flexion (Flex): nil  Retraction (RET): min and pain  Extension (EXT): major and pain  Lateral Flexion Right (LF R): major and pain  Lateral Flexion Left (LF L): major and pain  Rotation Right (ROT R): min and pain  Rotation Left (ROT L): mod and pain  Test Movements:  RET: During: increases  After: no worse  Mechanical Response: no effect  Repeat RET: During: decreases  After: better  Mechanical Response: IncROM  RET EXT: During: increases  After: no worse  Mechanical Response: no effect  Repeat RET EXT: During: increases  After: worse  Mechanical Response: no effect  Pretest Pain Sitting: L cervical  LF L: During: increases  After: no worse  Mechanical Response: no effect  Repeat LF L: During: decreases  After: better  Mechanical Response: IncROM  Conclusion: derangement (vs other, provisional)  Principle of Treatment:  Posture Correction:  slouch/over correct, location of neutral spine, use of lumbar support, use of cervical roll while sleeping  Extension: seated retraction with pt OP  Lateral: seated left lateral flexion with pt OP      Assessment/Plan:    Patient is a 61 year old male with cervical complaints.    PATIENTS NAME:  Adolfo Koo   : 1955    Patient has the following significant findings with corresponding treatment plan.                Diagnosis 1:  Neck pain  Pain -  manual therapy, self management, education, directional preference exercise and home program  Decreased ROM/flexibility - manual therapy, therapeutic exercise and home program  Decreased joint mobility - manual therapy, therapeutic exercise and home program  Decreased function - therapeutic activities and home program  Impaired posture - neuro re-education, therapeutic activities and home program    Therapy Evaluation Codes:   1) History comprised of:   Personal factors that impact the plan of care:      Time since onset of symptoms.    Comorbidity factors that impact the plan of care are:      Asthma.     Medications impacting care: Pain.  2) Examination of Body Systems comprised of:   Body structures and functions that impact the plan of care:      Cervical spine.   Activity limitations that impact the plan of care are:      Lifting, Sitting and Sleeping.  3) Clinical presentation characteristics are:   Stable/Uncomplicated.  4) Decision-Making    Low complexity using standardized patient assessment instrument and/or measureable assessment of functional outcome.  Cumulative Therapy Evaluation is: Low complexity.    Previous and current functional limitations:  (See Goal Flow Sheet for this information)    Short term and Long term goals: (See Goal Flow Sheet for this information)     Communication ability:  Patient appears to be able to clearly communicate and understand verbal and written communication and follow directions correctly.  Treatment Explanation -  "The following has been discussed with the patient:   RX ordered/plan of care  Anticipated outcomes  Possible risks and side effects  This patient would benefit from PT intervention to resume normal activities.   Rehab potential is fair to good.    Frequency:  1 X week, once daily  Duration:  for 8 weeks  Discharge Plan:  Achieve all LTG.  Independent in home treatment program.  Reach maximal therapeutic benefit.        PATIENTS NAME:  Adolfo Koo   : 1955    Caregiver Signature/Credentials _____________________________ Date ________       Treating Provider: Susie Sherwood, PT     I have reviewed and certified the need for these services and plan of treatment while under my care.        PHYSICIAN'S SIGNATURE:   _________________________________________  Date___________   Obdulio Guerrero    Certification period:  Beginning of Cert date period: 05/10/17 to  End of Cert period date: 17     Functional Level Progress Report: Please see attached \"Goal Flow sheet for Functional level.\"    ____X____ Continue Services or       ________ DC Services                Service dates: From  SOC Date: 05/10/17 date to present                         "

## 2017-07-11 PROBLEM — M54.2 CERVICALGIA: Status: RESOLVED | Noted: 2017-05-10 | Resolved: 2017-07-11

## 2017-07-11 NOTE — PROGRESS NOTES
Please refer to Initial Evaluation for discharge status as well.  Patient did not return for further PT.

## 2018-05-08 ENCOUNTER — THERAPY VISIT (OUTPATIENT)
Dept: PHYSICAL THERAPY | Facility: CLINIC | Age: 63
End: 2018-05-08
Payer: MEDICARE

## 2018-05-08 DIAGNOSIS — M47.817 LUMBOSACRAL SPONDYLOSIS WITHOUT MYELOPATHY: Primary | ICD-10-CM

## 2018-05-08 DIAGNOSIS — M47.812 CERVICAL SPONDYLOSIS WITHOUT MYELOPATHY: ICD-10-CM

## 2018-05-08 PROCEDURE — 97112 NEUROMUSCULAR REEDUCATION: CPT | Mod: GP | Performed by: PHYSICAL THERAPIST

## 2018-05-08 PROCEDURE — G8981 BODY POS CURRENT STATUS: HCPCS | Mod: GP | Performed by: PHYSICAL THERAPIST

## 2018-05-08 PROCEDURE — G8982 BODY POS GOAL STATUS: HCPCS | Mod: GP | Performed by: PHYSICAL THERAPIST

## 2018-05-08 PROCEDURE — 97110 THERAPEUTIC EXERCISES: CPT | Mod: GP | Performed by: PHYSICAL THERAPIST

## 2018-05-08 PROCEDURE — G8983 BODY POS D/C STATUS: HCPCS | Mod: GP | Performed by: PHYSICAL THERAPIST

## 2018-05-08 PROCEDURE — 97162 PT EVAL MOD COMPLEX 30 MIN: CPT | Mod: GP | Performed by: PHYSICAL THERAPIST

## 2018-05-08 NOTE — LETTER
"DEPARTMENT OF HEALTH AND HUMAN SERVICES  CENTERS FOR MEDICARE & MEDICAID SERVICES    PLAN/UPDATED PLAN OF PROGRESS FOR OUTPATIENT REHABILITATION    PATIENTS NAME:  Adolfo Koo   : 1955  PROVIDER NUMBER:    [id number]    [id number]     PROVIDER NAME: [place] [facility]    MEDICAL RECORD NUMBER: [id number]     START OF CARE DATE:  SOC Date: 18   TYPE:  PT    PRIMARY/TREATMENT DIAGNOSIS: (Pertinent Medical Diagnosis)   Lumbosacral spondylosis without myelopathy  Cervical spondylosis without myelopathy    VISITS FROM START OF CARE:  Rxs Used: 1     [place] [facility] Initial Evaluation    Subjective:  Patient is a 62 year old male presenting with rehab cervical spine hpi and rehab back hpi.            Patient reports pain:  Cervical left side.  Radiates to:  Shoulder left, upper arm left, elbow left, lower arm left and hand left.  Pain is described as sharp and is intermittent and reported as 10/10.  Associated symptoms:  Tingling, numbness, headache, loss of strength and loss of motion/stiffness (T/N down L arm to hand daily, L arm gets tired, suboccipital HA's 2x/week). Pain is the same all the time.  Symptoms are exacerbated by rotating head and looking up or down (turning head to the left, losing 1-2 hours of sleep per night) and relieved by analgesics, ice and heat (TENS unit).  Since onset symptoms are gradually worsening.  General health as reported by patient is fair.    Medical allergies: no.    Current medications:  Pain medication.  Current occupation is None.      Adolfo Koo is a 62 year old male with a lumbar condition.      This is a chronic condition  18 saw MD For ongoing LBP since  when he originally fell down a flight of steps at .    Patient reports pain:  Lumbar spine right.  Radiates to:  No radiation.  Pain is described as aching ('boom boom pain\") and is intermittent and reported as 10/10.  Associated " symptoms:  Loss of motion/stiffness (B knees buckle due to cramps). Pain is the same all the time.  Symptoms are exacerbated by sitting, walking, standing, bending and lifting (sitting 10 min) and relieved by ice, heat and analgesics.  Since onset symptoms are gradually worsening.                          Objective:       Lumbar/SI Evaluation  ROM:    AROM Lumbar:   Flexion:          Min loss, reaches to mid shin with pain and difficulty with curve reversal   PATIENTS NAME:  Adolfo Koo   : 1955    Ext:                    Min loss no pain   Side Bend:        Left:     Right:   Rotation:           Left:     Right:   Side Glide:        Left:  No loss    Right:  No loss  Neural Tension/Mobility:    Left side:  Slump positive.   Right side:   Slump positive.      Cervical/Thoracic Evaluation  AROM:  AROM Cervical:  Flexion:            No loss   Extension:       Mod loss   Rotation:         Left: min loss + L sided pain     Right: min loss  Side Bend:      Left: min loss with L sided pain     Right:  Min loss with L sided pain  Headaches: cervical (suboccipital )    Ingrid Cervical Evaluation  Posture:  Sitting: poor  Standing: fair  Protruding Head: yes  Wry Neck: no  Correction of Posture: no effect  Conclusion cervical ingrid: not able to complete full assessment, pt gives up and doesn't want to try retractions.  Principle of Treatment:  Other: Attempt cervical retractions for HEP seated/supin.  instructed on SOR for self MFR with tennis balls      Ingrid Lumbar Evaluation  Posture:  Sitting: poor  Standing: fair  Lordosis: WNL  Lateral Shift: no  Correction of Posture: no effect  Other Observations: pt reports unable to go prone so unable to test  Test Movements:  FIS: Pretest Movements: 1/10 LBP  EIS: During: decreases  After: no better    Repeat EIS: During: decreases  After: no better    Conclusion lumbar: unable to complete full assessment as patient gets tired, low back hurts just lying supine  while assessing neck and wants to leave.  Principle of Treatment:  Posture Correction: Extensive education importance keeping neutral lumbar C-T-L spine, lifetime awareness of posture, use of lumbar roll, slouch/over-correct for prolonged sitting, over-correct and back off 10% to neutral unsupported sitting, educate (+) slump testing and need for good posture, prevent progression of poor posture, educate how postures and repetitive bending can cause pain etc  Extension: EIS x 10 4-6x/day for HEP  PATIENTS NAME:  Adolfo Koo   : 1955    Assessment/Plan:    Patient is a 62 year old male with cervical and lumbar complaints.  Before evaluation started the patient reports has done many therapies including pool therapy in the past without relief so does not think therapy will help and wants one visit only.  Patient has the following significant findings with corresponding treatment plan.                Diagnosis 1:  Cervical spondylosis  Pain -  home program  Decreased ROM/flexibility - home program  Decreased strength - home program  Decreased function - home program  Impaired posture - home program  Diagnosis 2:  Lumbar spondylosis  Pain -  home program  Decreased ROM/flexibility - home program  Decreased strength - home program  Decreased function - home program  Impaired posture - home program    Therapy Evaluation Codes:   1) History comprised of:   Personal factors that impact the plan of care:      Overall behavior pattern, Past/current experiences, Time since onset of symptoms and question why patient stayed for appointment when said doesn't think will help and only one visit (secondary gain?).    Comorbidity factors that impact the plan of care are:      Numbness/tingling, Pain at night/rest and Weakness.     Medications impacting care: Anti-inflammatory and Pain.  2) Examination of Body Systems comprised of:   Body structures and functions that impact the plan of care:      Cervical spine, Lumbar spine  and Shoulder.   Activity limitations that impact the plan of care are:      Bathing, Bending, Lifting, Sitting, Standing, Walking, Sleeping and Laying down.  3) Clinical presentation characteristics are:   Evolving/Changing.  4) Decision-Making    Moderate complexity using standardized patient assessment instrument and/or measureable assessment of functional outcome.  Cumulative Therapy Evaluation is: Moderate complexity.    Previous and current functional limitations:  (See Goal Flow Sheet for this information)    Short term and Long term goals: (See Goal Flow Sheet for this information)   Communication ability:  Patient appears to be able to clearly communicate and understand verbal and written communication and follow directions correctly.  Treatment Explanation - The following has been discussed with the patient:   RX ordered/plan of care  Anticipated outcomes  Possible risks and side effects  This patient would benefit from PT intervention to resume normal activities.   Rehab potential is poor due to patient not wanting therapy, chronicity of symptoms even with extensive therapies in past.  PATIENTS NAME:  Adolfo Koo   : 1955    Frequency:  1 X week, once daily  Duration:  for 1 weeks  (1 visit only per patient request even before therapy appointment started)  Discharge Plan:  Achieve all LTG.  Independent in home treatment program.  Reach maximal therapeutic benefit.    Please refer to initial evaluation for D/C report as one time visit only.      Caregiver Signature/Credentials _____________________________ Date ________       Treating Provider: Rere Navarrete DPT     I have reviewed and certified the need for these services and plan of treatment while under my care.        PHYSICIAN'S SIGNATURE:   _________________________________________  Date___________   Mala Otto MD    Certification period:  Beginning of Cert date period: 18 to  End of Cert period date: 18     Functional  "Level Progress Report: Please see attached \"Goal Flow sheet for Functional level.\"    ________ Continue Services or       __X______ DC Services                Service dates: From  SOC Date: 05/08/18 date to present                         "

## 2018-05-08 NOTE — PROGRESS NOTES
"[place] [facility] Initial Evaluation  Subjective:  Patient is a 62 year old male presenting with rehab cervical spine hpi and rehab back hpi.              Patient reports pain:  Cervical left side.  Radiates to:  Shoulder left, upper arm left, elbow left, lower arm left and hand left.  Pain is described as sharp and is intermittent and reported as 10/10.  Associated symptoms:  Tingling, numbness, headache, loss of strength and loss of motion/stiffness (T/N down L arm to hand daily, L arm gets tired, suboccipital HA's 2x/week). Pain is the same all the time.  Symptoms are exacerbated by rotating head and looking up or down (turning head to the left, losing 1-2 hours of sleep per night) and relieved by analgesics, ice and heat (TENS unit).  Since onset symptoms are gradually worsening.        General health as reported by patient is fair.    Medical allergies: no.    Current medications:  Pain medication.  Current occupation is None  .                  Adolfo Koo is a 62 year old male with a lumbar condition.      This is a chronic condition  4/30/18 saw MD For ongoing LBP since 2002 when he originally fell down a flight of steps at .    Patient reports pain:  Lumbar spine right.  Radiates to:  No radiation.  Pain is described as aching ('boom boom pain\") and is intermittent and reported as 10/10.  Associated symptoms:  Loss of motion/stiffness (B knees buckle due to cramps). Pain is the same all the time.  Symptoms are exacerbated by sitting, walking, standing, bending and lifting (sitting 10 min) and relieved by ice, heat and analgesics.  Since onset symptoms are gradually worsening.                                                      Objective:  System         Lumbar/SI Evaluation  ROM:    AROM Lumbar:   Flexion:          Min loss, reaches to mid shin with pain and difficulty with curve reversal   Ext:                    Min loss no pain   Side Bend:        Left:     Right:   Rotation:           " Left:     Right:   Side Glide:        Left:  No loss    Right:  No loss                  Neural Tension/Mobility:    Left side:  Slump positive.   Right side:   Slump positive.               Cervical/Thoracic Evaluation    AROM:  AROM Cervical:    Flexion:            No loss   Extension:       Mod loss   Rotation:         Left: min loss + L sided pain     Right: min loss  Side Bend:      Left: min loss with L sided pain     Right:  Min loss with L sided pain      Headaches: cervical (suboccipital )                                                            Ingrid Cervical Evaluation    Posture:  Sitting: poor  Standing: fair  Protruding Head: yes  Wry Neck: no  Correction of Posture: no effect          Conclusion cervical ingrid: not able to complete full assessment, pt gives up and doesn't want to try retractions.  Principle of Treatment:          Other: Attempt cervical retractions for HEP seated/supin.  instructed on SOR for self MFR with tennis balls  Ingrid Lumbar Evaluation    Posture:  Sitting: poor  Standing: fair  Lordosis: WNL  Lateral Shift: no  Correction of Posture: no effect  Other Observations: pt reports unable to go prone so unable to test    Test Movements:  FIS: Pretest Movements: 1/10 LBP    EIS: During: decreases  After: no better    Repeat EIS: During: decreases  After: no better              Conclusion lumbar: unable to complete full assessment as patient gets tired, low back hurts just lying supine while assessing neck and wants to leave.  Principle of Treatment:  Posture Correction: Extensive education importance keeping neutral lumbar C-T-L spine, lifetime awareness of posture, use of lumbar roll, slouch/over-correct for prolonged sitting, over-correct and back off 10% to neutral unsupported sitting, educate (+) slump testing and need for good posture, prevent progression of poor posture, educate how postures and repetitive bending can cause pain etc    Extension: EIS x 10 4-6x/day  for HEP                                           ROS    Assessment/Plan:    Patient is a 62 year old male with cervical and lumbar complaints.  Before evaluation started the patient reports has done many therapies including pool therapy in the past without relief so does not think therapy will help and wants one visit only.  Patient has the following significant findings with corresponding treatment plan.                Diagnosis 1:  Cervical spondylosis    Pain -  home program  Decreased ROM/flexibility - home program  Decreased strength - home program  Decreased function - home program  Impaired posture - home program  Diagnosis 2:  Lumbar spondylosis     Pain -  home program  Decreased ROM/flexibility - home program  Decreased strength - home program  Decreased function - home program  Impaired posture - home program    Therapy Evaluation Codes:   1) History comprised of:   Personal factors that impact the plan of care:      Overall behavior pattern, Past/current experiences, Time since onset of symptoms and question why patient stayed for appointment when said doesn't think will help and only one visit (secondary gain?).    Comorbidity factors that impact the plan of care are:      Numbness/tingling, Pain at night/rest and Weakness.     Medications impacting care: Anti-inflammatory and Pain.  2) Examination of Body Systems comprised of:   Body structures and functions that impact the plan of care:      Cervical spine, Lumbar spine and Shoulder.   Activity limitations that impact the plan of care are:      Bathing, Bending, Lifting, Sitting, Standing, Walking, Sleeping and Laying down.  3) Clinical presentation characteristics are:   Evolving/Changing.  4) Decision-Making    Moderate complexity using standardized patient assessment instrument and/or measureable assessment of functional outcome.  Cumulative Therapy Evaluation is: Moderate complexity.    Previous and current functional limitations:  (See Goal Flow  Sheet for this information)    Short term and Long term goals: (See Goal Flow Sheet for this information)     Communication ability:  Patient appears to be able to clearly communicate and understand verbal and written communication and follow directions correctly.  Treatment Explanation - The following has been discussed with the patient:   RX ordered/plan of care  Anticipated outcomes  Possible risks and side effects  This patient would benefit from PT intervention to resume normal activities.   Rehab potential is poor due to patient not wanting therapy, chronicity of symptoms even with extensive therapies in past.    Frequency:  1 X week, once daily  Duration:  for 1 weeks  (1 visit only per patient request even before therapy appointment started)  Discharge Plan:  Achieve all LTG.  Independent in home treatment program.  Reach maximal therapeutic benefit.    Please refer to the daily flowsheet for treatment today, total treatment time and time spent performing 1:1 timed codes.

## 2018-05-08 NOTE — MR AVS SNAPSHOT
"              After Visit Summary   2018    Adolfo Koo    MRN: [id number]           Patient Information     Date Of Birth          1955        Visit Information        Provider Department      2018 9:30 AM Rere Navarrete PT [facility]        Today's Diagnoses     Lumbosacral spondylosis without myelopathy    -  1    Cervical spondylosis without myelopathy           Follow-ups after your visit        Who to contact     If you have questions or need follow up information about today's clinic visit or your schedule please contact [facility] PH[facility] directly at [phone].  Normal or non-critical lab and imaging results will be communicated to you by [employer], letter or phone within 4 business days after the clinic has received the results. If you do not hear from us within 7 days, please contact the clinic through [employer] or phone. If you have a critical or abnormal lab result, we will notify you by phone as soon as possible.  Submit refill requests through [employer] or call your pharmacy and they will forward the refill request to us. Please allow 3 business days for your refill to be completed.          Additional Information About Your Visit        MyChart Information     [employer] lets you send messages to your doctor, view your test results, renew your prescriptions, schedule appointments and more. To sign up, go to [email or web address] . Click on \"Log in\" on the left side of the screen, which will take you to the Welcome page. Then click on \"Sign up Now\" on the right side of the page.     You will be asked to enter the access code listed below, as well as some personal information. Please follow the directions to create your username and password.     Your access code is: [email or web address]  Expires: 2018 10:46 AM     Your access code will  in 90 days. If you need help or a new code, please " call your [place] clinic or [phone].        Care EveryWhere ID     This is your Care EveryWhere ID. This could be used by other organizations to access your [place] medical records  [id number]         Blood Pressure from Last 3 Encounters:   01/21/15 122/71   08/07/08 94/64   06/26/08 98/60    Weight from Last 3 Encounters:   01/21/15 73.9 kg (163 lb)   05/30/07 79.4 kg (175 lb)   11/02/06 69.4 kg (153 lb)              We Performed the Following     HC PT EVAL, MODERATE COMPLEXITY     VEGA CERT REPORT     VEGA INITIAL EVAL REPORT     NEUROMUSCULAR RE-EDUCATION     THERAPEUTIC EXERCISES        Primary Care Provider Office Phone # Fax #    Js Farris [phone]       [address] [place] [address]        Equal Access to Services     [place] : Hadii aad ku hadasho Soomaali, waaxda luqadaha, qaybta kaalmada adeegyada, obey parry hayhuong salinas . So [facility] [phone].    ATENCIÓN: Si habla español, tiene a lake disposición servicios gratuitos de asistencia lingüística. Ha al [phone].    We comply with applicable federal civil rights laws and [place] laws. We do not discriminate on the basis of race, color, national origin, age, disability, sex, sexual orientation, or gender identity.            Thank you!     Thank you for choosing [facility]ERAP[facility]  for your care. Our goal is always to provide you with excellent care. Hearing back from our patients is one way we can continue to improve our services. Please take a few minutes to complete the written survey that you may receive in the mail after your visit with us. Thank you!             Your Updated Medication List - Protect others around you: Learn how to safely use, store and throw away your medicines at www.disposemymeds.org.          This list is accurate as of 5/8/18 10:46 AM.  Always use your most recent med list.                   Brand Name  Dispense Instructions for use Diagnosis    ADVAIR DISKUS 250-50 MCG/DOSE diskus inhaler   Generic drug:  fluticasone-salmeterol     1    1 inhalation BID    Chronic airway obstruction, not elsewhere classified       albuterol 90 MCG/ACT inhaler     1    1-2 puffs Q 4-6 hrs prn    Chronic airway obstruction, not elsewhere classified       ibuprofen 600 MG tablet    ADVIL/MOTRIN    90    1 tablet up to three times per day as needed (take with food)    Cervicalgia, Sprain of neck       LIDODERM 5 % Patch   Generic drug:  lidocaine     30    Apply 1-3 daily as neede to painful areas    Cervicalgia, Lumbago       NEURONTIN 100 MG capsule   Generic drug:  gabapentin     90    Take 1 at night.  Increase by 1 every 7 days until taking 1 every 8 hrs.    Lumbago, Cervicalgia       PROAIR  (90 Base) MCG/ACT Inhaler   Generic drug:  albuterol     1    2 puffs up to four times per day as needed    Chronic airway obstruction, not elsewhere classified       SINGULAIR 10 MG tablet   Generic drug:  montelukast     1 month    1 tab po QD (Once per day)    Chronic airway obstruction, not elsewhere classified       ULTRAM 50 MG tablet   Generic drug:  traMADol     90    1 TABLET EVERY 8-12 HOURS AS NEEDED    Lumbago, Cervicalgia

## 2018-08-07 ENCOUNTER — DOCUMENTATION ONLY (OUTPATIENT)
Dept: FAMILY MEDICINE | Facility: CLINIC | Age: 63
End: 2018-08-07

## 2018-11-09 ENCOUNTER — DOCUMENTATION ONLY (OUTPATIENT)
Dept: FAMILY MEDICINE | Facility: CLINIC | Age: 63
End: 2018-11-09

## 2018-12-03 NOTE — PROGRESS NOTES
Visit to the client's home for follow-up as he PCA assessment was due.  An  was not needed.    Current situation/living environment  Clt lives alone in single family house.  His friend/PCA was also present during the visit    Activities of daily living (ADL)/instrumental activities of daily living (IADL) and functional issues  Client needs help with the following ADL's: dressing, bathing and walking  Client needs help with the following IADL's: shopping, cooking, housekeeping and laundry  Client states he is unable to perform the above due to SOB with activity and neck, knee and LBP      Health concerns for today  He continues to have neck and LBP and takes pain meds and rubs ointment as needed.  He also does ROM exercises.  He has R knee brace, back brace and L shoulder brace  Has patient fallen 2 or more times in the last year? No  Has patient fallen with injury in the last year? No    Cognition/mental health  Alert and oriented X3, Is able to make his needs known.    STARS/Med Adherence  Client is non-compliant with the following quality measures: Breast cancer screening  Comments: education efforts    Client's Plan of Care consists of:  Homemaker services (5 hours per week), Personal care assistance (PCA) (3.5hours per day), and Specialized supplies and equipment (cane, walker, shower chair).  CM had ordered bathroom bars but clt said he has not received it yet.  ADAN to f/u with [facility]

## 2024-06-10 NOTE — MR AVS SNAPSHOT
"              After Visit Summary   5/10/2017    Adolfo Koo    MRN: [id number]           Patient Information     Date Of Birth          1955        Visit Information        Provider Department      5/10/2017 9:00 AM Susie Gonzales PT [facility]         Follow-ups after your visit        Your next 10 appointments already scheduled     May 10, 2017  9:00 AM CDT   VEGA Extremity with Susie Cote PT   [facility] ([facility])    [address] [id number] [phone]              Who to contact     If you have questions or need follow up information about today's clinic visit or your schedule please contact [facility] PH[facility] directly at [phone].  Normal or non-critical lab and imaging results will be communicated to you by MyChart, letter or phone within 4 business days after the clinic has received the results. If you do not hear from us within 7 days, please contact the clinic through [employer] or phone. If you have a critical or abnormal lab result, we will notify you by phone as soon as possible.  Submit refill requests through [employer] or call your pharmacy and they will forward the refill request to us. Please allow 3 business days for your refill to be completed.          Additional Information About Your Visit        MyChart Information     [employer] lets you send messages to your doctor, view your test results, renew your prescriptions, schedule appointments and more. To sign up, go to [email or web address] . Click on \"Log in\" on the left side of the screen, which will take you to the Welcome page. Then click on \"Sign up Now\" on the right side of the page.     You will be asked to enter the access code listed below, as well as some personal information. Please follow the " directions to create your username and password.     Your access code is: [email or web address]  Expires: 2017 11:30 AM     Your access code will  in 90 days. If you need help or a new code, please call your [facility] clinic or [phone].        Care EveryWhere ID     This is your Care EveryWhere ID. This could be used by other organizations to access your [facility] medical records  [id number]         Blood Pressure from Last 3 Encounters:   01/21/15 122/71   08 94/64   08 98/60    Weight from Last 3 Encounters:   01/21/15 73.9 kg (163 lb)   07 79.4 kg (175 lb)   06 69.4 kg (153 lb)              Today, you had the following     No orders found for display       Primary Care Provider Office Phone # Fax #    Js Farris [phone]       [facility] [address] [place] [address]        Thank you!     Thank you for [facility] PH[facility]ER[facility]  for your care. Our goal is always to provide you with excellent care. Hearing back from our patients is one way we can continue to improve our services. Please take a few minutes to complete the written survey that you may receive in the mail after your visit with us. Thank you!             Your Updated Medication List - Protect others around you: Learn how to safely use, store and throw away your medicines at www.disposemymeds.org.          This list is accurate as of: 5/10/17  8:29 AM.  Always use your most recent med list.                   Brand Name Dispense Instructions for use    ADVAIR DISKUS 250-50 MCG/DOSE diskus inhaler   Generic drug:  fluticasone-salmeterol     1    1 inhalation BID       albuterol 108 (90 BASE) MCG/ACT Inhaler   Generic drug:  albuterol     1    2 puffs up to four times per day as needed       albuterol 90 MCG/ACT inhaler     1    1-2 puffs Q 4-6 hrs prn       ibuprofen 600 MG tablet    ADVIL/MOTRIN    90    1 tablet up to three  times per day as needed (take with food)       LIDODERM 5 % Patch   Generic drug:  lidocaine     30    Apply 1-3 daily as neede to painful areas       NEURONTIN 100 MG capsule   Generic drug:  gabapentin     90    Take 1 at night.  Increase by 1 every 7 days until taking 1 every 8 hrs.       SINGULAIR 10 MG tablet   Generic drug:  montelukast     1 month    1 tab po QD (Once per day)       ULTRAM 50 MG tablet   Generic drug:  traMADol     90    1 TABLET EVERY 8-12 HOURS AS NEEDED          0